# Patient Record
Sex: MALE | Race: WHITE | NOT HISPANIC OR LATINO | Employment: FULL TIME | URBAN - METROPOLITAN AREA
[De-identification: names, ages, dates, MRNs, and addresses within clinical notes are randomized per-mention and may not be internally consistent; named-entity substitution may affect disease eponyms.]

---

## 2017-11-01 ENCOUNTER — TELEPHONE (OUTPATIENT)
Dept: FAMILY MEDICINE | Facility: CLINIC | Age: 37
End: 2017-11-01

## 2017-11-01 DIAGNOSIS — J02.0 STREPTOCOCCAL SORE THROAT: Primary | ICD-10-CM

## 2017-11-01 RX ORDER — PENICILLIN V POTASSIUM 500 MG/1
500 TABLET, FILM COATED ORAL 2 TIMES DAILY
Qty: 20 TABLET | Refills: 0 | Status: SHIPPED | OUTPATIENT
Start: 2017-11-01 | End: 2019-07-29

## 2017-11-01 NOTE — TELEPHONE ENCOUNTER
"Reason for call:  Patient reporting a symptom    Symptom or request: Pt's daughter was diagnosed with strep a couple of days ago by Dr. Knott [facility].  Pt now has a \"scratchy throat.\"  He is asking for an Rx for antibiotics - [facility].  He is flying out of the country in 4 hours and fears that if he went to , he will miss his flight.    Duration (how long have symptoms been present): today    Have you been treated for this before? No    Additional comments:     Phone Number patient can be reached at:  Cell number on file:    Telephone Information:   Mobile [phone]       Best Time:  any    Can we leave a detailed message on this number:  YES    Call taken on 11/1/2017 at 1:16 PM by Ely Polanco    "

## 2019-06-05 ENCOUNTER — NURSE TRIAGE (OUTPATIENT)
Dept: NURSING | Facility: CLINIC | Age: 39
End: 2019-06-05

## 2019-06-06 NOTE — TELEPHONE ENCOUNTER
"Reason for Call/Nurse Assessment:  Wife Griselda calling for Cesar. Reports he was vomiting yesterday but not today, \"he is just tired\" and now for the past 1-2 hours, has c/o sternum pain. Reports he took 2 tylenol but it's not helping. Caller is NOT with patient at time of triage call. Denies cardiac hx.     RN Action/Disposition:  Advised caller to call FNA back when she gets home for triage; if patient cold/clammy/pale/sweaty and looks like he will pass out, or if he is having trouble breathing/SOB, they should call 911.    Caller verbalized understanding of care advice given and plans to call FNA back in 20 minutes when she gets home for appropriate triage. Encouraged call back to FNA 24/7 for new/worsening symptoms or further questions.    Rafaela Olivarez RN  [facility]      "

## 2019-06-20 ENCOUNTER — TELEPHONE (OUTPATIENT)
Dept: FAMILY MEDICINE | Facility: CLINIC | Age: 39
End: 2019-06-20

## 2019-07-29 ENCOUNTER — OFFICE VISIT (OUTPATIENT)
Dept: FAMILY MEDICINE | Facility: CLINIC | Age: 39
End: 2019-07-29
Payer: COMMERCIAL

## 2019-07-29 VITALS
BODY MASS INDEX: 22.99 KG/M2 | SYSTOLIC BLOOD PRESSURE: 114 MMHG | OXYGEN SATURATION: 99 % | RESPIRATION RATE: 12 BRPM | TEMPERATURE: 96.2 F | WEIGHT: 155.2 LBS | DIASTOLIC BLOOD PRESSURE: 80 MMHG | HEART RATE: 78 BPM | HEIGHT: 69 IN

## 2019-07-29 DIAGNOSIS — Z12.11 SPECIAL SCREENING FOR MALIGNANT NEOPLASMS, COLON: ICD-10-CM

## 2019-07-29 DIAGNOSIS — Z00.00 ROUTINE GENERAL MEDICAL EXAMINATION AT A HEALTH CARE FACILITY: Primary | ICD-10-CM

## 2019-07-29 LAB — GLUCOSE SERPL-MCNC: 100 MG/DL (ref 70–99)

## 2019-07-29 PROCEDURE — 36415 COLL VENOUS BLD VENIPUNCTURE: CPT | Performed by: FAMILY MEDICINE

## 2019-07-29 PROCEDURE — 99396 PREV VISIT EST AGE 40-64: CPT | Performed by: FAMILY MEDICINE

## 2019-07-29 PROCEDURE — 82947 ASSAY GLUCOSE BLOOD QUANT: CPT | Performed by: FAMILY MEDICINE

## 2019-07-29 ASSESSMENT — MIFFLIN-ST. JEOR: SCORE: 1604.36

## 2019-07-29 NOTE — LETTER
[facility] [place]  [address]  [facility] [address]  Phone: [phone]      7/29/2019     Cesar Bass  [address]  [facility] [address]      Dear Cesar:    Thank you for allowing me to participate in your care. Your recent test results were reviewed and listed below.      Your results are provided below for your review  Results for orders placed or performed in visit on 07/29/19   Glucose   Result Value Ref Range    Glucose 100 (H) 70 - 99 mg/dL      Your test result is acceptable.         Thank you for choosing [facility]. As a result, please continue with the treatment plan discussed in the office. Return as discussed or sooner if symptoms worsen or fail to improve.     If you have any further questions or concerns, please do not hesitate to contact us.    Sincerely,        Dr. Chinedu Tellez/collin

## 2019-07-29 NOTE — PROGRESS NOTES
SUBJECTIVE:   CC: Cesar Bass is an 38 year old male who presents for preventive health visit.     Healthy Habits:    Do you get at least three servings of calcium containing foods daily (dairy, green leafy vegetables, etc.)? yes    Amount of exercise or daily activities, outside of work: 3 day(s) per week for 45 min     Problems taking medications regularly not applicable    Medication side effects: Not applicable     Have you had an eye exam in the past two years? no    Do you see a dentist twice per year? 1-2 times a year    Do you have sleep apnea, excessive snoring or daytime drowsiness?no          Today's PHQ-2 Score:   PHQ-2 ( 1999 Pf[employer]) 7/29/2019 5/13/2014   Q1: Little interest or pleasure in doing things 0 0   Q2: Feeling down, depressed or hopeless 0 0   PHQ-2 Score 0 0       Abuse: Current or Past(Physical, Sexual or Emotional)- No  Do you feel safe in your environment? Yes    Social History     Tobacco Use     Smoking status: Never Smoker     Smokeless tobacco: Never Used   Substance Use Topics     Alcohol use: No     If you drink alcohol do you typically have >3 drinks per day or >7 drinks per week? No                      Last PSA: No results found for: PSA    Reviewed orders with patient. Reviewed health maintenance and updated orders accordingly -   Labs reviewed in EPIC    Reviewed and updated as needed this visit by clinical staff  Tobacco  Allergies  Meds  Med Hx  Surg Hx  Fam Hx  Soc Hx        Reviewed and updated as needed this visit by Provider            ROS:  CONSTITUTIONAL: NEGATIVE for fever, chills, change in weight  INTEGUMENTARY/SKIN: NEGATIVE for worrisome rashes, moles or lesions  EYES: NEGATIVE for vision changes or irritation  ENT: NEGATIVE for ear, mouth and throat problems  RESP: NEGATIVE for significant cough or SOB  CV: NEGATIVE for chest pain, palpitations or peripheral edema  GI: NEGATIVE for nausea, abdominal pain, heartburn, or change in bowel habits   male:  "negative for dysuria, hematuria, decreased urinary stream, erectile dysfunction, urethral discharge  MUSCULOSKELETAL: NEGATIVE for significant arthralgias or myalgia  NEURO: NEGATIVE for weakness, dizziness or paresthesias  PSYCHIATRIC: NEGATIVE for changes in mood or affect    OBJECTIVE:   /80 (BP Location: Right arm, Patient Position: Sitting, Cuff Size: Adult Regular)   Pulse 78   Temp 96.2  F (35.7  C) (Tympanic)   Resp 12   Ht 1.753 m (5' 9\")   Wt 70.4 kg (155 lb 3.2 oz)   SpO2 99%   BMI 22.92 kg/m    EXAM:  GENERAL: healthy, alert and no distress  EYES: Eyes grossly normal to inspection, PERRL and conjunctivae and sclerae normal  HENT: ear canals and TM's normal, nose and mouth without ulcers or lesions  NECK: no adenopathy, no asymmetry, masses, or scars and thyroid normal to palpation  RESP: lungs clear to auscultation - no rales, rhonchi or wheezes  CV: regular rate and rhythm, normal S1 S2, no S3 or S4, no murmur, click or rub, no peripheral edema and peripheral pulses strong  ABDOMEN: soft, nontender, no hepatosplenomegaly, no masses and bowel sounds normal  MS: no gross musculoskeletal defects noted, no edema  SKIN: no suspicious lesions or rashes  NEURO: Normal strength and tone, mentation intact and speech normal  PSYCH: mentation appears normal, affect normal/bright    Diagnostic Test Results:  Labs reviewed in Epic    ASSESSMENT/PLAN:   Cesar was seen today for physical.    Diagnoses and all orders for this visit:    Routine general medical examination at a health care facility  -     Glucose        COUNSELING:  Reviewed preventive health counseling, as reflected in patient instructions       Regular exercise       Healthy diet/nutrition    Estimated body mass index is 22.92 kg/m  as calculated from the following:    Height as of this encounter: 1.753 m (5' 9\").    Weight as of this encounter: 70.4 kg (155 lb 3.2 oz).         reports that he has never smoked. He has never used smokeless " tobacco.      Counseling Resources:  ATP IV Guidelines  Pooled Cohorts Equation Calculator  FRAX Risk Assessment  ICSI Preventive Guidelines  Dietary Guidelines for Americans, 2010  USDA's MyPlate  ASA Prophylaxis  Lung CA Screening    Chinedu Tellez MD  [facility]

## 2019-08-09 ENCOUNTER — NURSE TRIAGE (OUTPATIENT)
Dept: NURSING | Facility: CLINIC | Age: 39
End: 2019-08-09

## 2019-08-09 NOTE — TELEPHONE ENCOUNTER
3 years ago similar thing happened. Went to ER and Sports Med, PT, Chiro, nothing worked and was told he has to wait for it to pass. He won't go to the ER and doesn't want an appointment. He wanted me to diagnose the numbness. He thinks it's a pinched nerve. Torticollis started this, according to him. Lifted weights hoping it would help but seems to make it worse. He said if nausea returns he will seek out help. He's going to take ibuprofen and ice it.  Bibi Smiley RN-[facility]      Additional Information    Negative: Shock suspected (e.g., cold/pale/clammy skin, too weak to stand, low BP, rapid pulse)    Negative: Similar pain previously and it was from 'heart attack'    Negative: Similar pain previously from 'angina' and not relieved by nitroglycerin    Negative: Difficult to awaken or acting confused (e.g., disoriented, slurred speech)    Negative: Sounds like a life-threatening emergency to the triager    Negative: Followed an injury to neck (e.g., MVA, sports, impact or collision)    Negative: Chest pain    Negative: Lymph node in the neck is swollen or painful to the touch    Negative: Sore throat is the main symptom    Negative: Difficulty breathing or unusual sweating (e.g., sweating without exertion)    Negative: Chest pain lasting longer than 5 minutes    Negative: Stiff neck (can't touch chin to chest) and has headache    Negative: Stiff neck (can't touch chin to chest) and fever    Negative: Weakness of an arm or hand     Ring finger to shoulder numbness 80% sensation.    Negative: Problems with bowel or bladder control    Patient sounds very sick or weak to the triager     Pain at right now 7    Protocols used: NECK PAIN OR ICHPGDAMP-G-CT

## 2019-12-24 ENCOUNTER — OFFICE VISIT (OUTPATIENT)
Dept: FAMILY MEDICINE | Facility: CLINIC | Age: 39
End: 2019-12-24
Payer: COMMERCIAL

## 2019-12-24 VITALS
DIASTOLIC BLOOD PRESSURE: 84 MMHG | RESPIRATION RATE: 16 BRPM | SYSTOLIC BLOOD PRESSURE: 118 MMHG | HEIGHT: 69 IN | OXYGEN SATURATION: 98 % | HEART RATE: 73 BPM | TEMPERATURE: 97.3 F | WEIGHT: 153 LBS | BODY MASS INDEX: 22.66 KG/M2

## 2019-12-24 DIAGNOSIS — R03.0 ELEVATED BLOOD PRESSURE READING WITHOUT DIAGNOSIS OF HYPERTENSION: Primary | ICD-10-CM

## 2019-12-24 LAB
ANION GAP SERPL CALCULATED.3IONS-SCNC: 1 MMOL/L (ref 3–14)
BUN SERPL-MCNC: 18 MG/DL (ref 7–30)
CALCIUM SERPL-MCNC: 9.1 MG/DL (ref 8.5–10.1)
CHLORIDE SERPL-SCNC: 106 MMOL/L (ref 94–109)
CHOLEST SERPL-MCNC: 140 MG/DL
CO2 SERPL-SCNC: 30 MMOL/L (ref 20–32)
CREAT SERPL-MCNC: 0.95 MG/DL (ref 0.66–1.25)
GFR SERPL CREATININE-BSD FRML MDRD: >90 ML/MIN/{1.73_M2}
GLUCOSE SERPL-MCNC: 89 MG/DL (ref 70–99)
HDLC SERPL-MCNC: 68 MG/DL
LDLC SERPL CALC-MCNC: 57 MG/DL
NONHDLC SERPL-MCNC: 72 MG/DL
POTASSIUM SERPL-SCNC: 4.5 MMOL/L (ref 3.4–5.3)
SODIUM SERPL-SCNC: 137 MMOL/L (ref 133–144)
TRIGL SERPL-MCNC: 73 MG/DL

## 2019-12-24 PROCEDURE — 80048 BASIC METABOLIC PNL TOTAL CA: CPT | Performed by: FAMILY MEDICINE

## 2019-12-24 PROCEDURE — 36415 COLL VENOUS BLD VENIPUNCTURE: CPT | Performed by: FAMILY MEDICINE

## 2019-12-24 PROCEDURE — 99214 OFFICE O/P EST MOD 30 MIN: CPT | Performed by: FAMILY MEDICINE

## 2019-12-24 PROCEDURE — 80061 LIPID PANEL: CPT | Performed by: FAMILY MEDICINE

## 2019-12-24 ASSESSMENT — MIFFLIN-ST. JEOR: SCORE: 1589.38

## 2019-12-24 NOTE — PROGRESS NOTES
"Subjective     Cesar Bass is a 39 year old male who presents to clinic today for the following health issues:    HPI     Chief Complaint   Patient presents with     Hypertension     patient states his blood pressure was high when he went to give blood about 1 mo. ago  he has been taking his Blood pressure at the drug store  and is getting readings of 128/84                Reviewed and updated as needed this visit by Provider         Review of Systems         Objective    /84   Pulse 73   Temp 97.3  F (36.3  C)   Resp 16   Ht 1.753 m (5' 9\")   Wt 69.4 kg (153 lb)   SpO2 98%   BMI 22.59 kg/m    Body mass index is 22.59 kg/m .  Physical Exam               Further history obtained, clarified or corrected by physician:  He is very concerned and actually somewhat nervous about having already over 87 while he was giving blood last week.  He has checked his blood pressure many times since then and brings in that list showing they are all within normal limits.    OBJECTIVE:  /84   Pulse 73   Temp 97.3  F (36.3  C)   Resp 16   Ht 1.753 m (5' 9\")   Wt 69.4 kg (153 lb)   SpO2 98%   BMI 22.59 kg/m    LUNGS: clear to auscultation, normal breath sounds  CV: RRR without murmur  ABD: BS+, soft, nontender, no masses, no hepatosplenomegaly  EXTREMITIES: without joint tenderness, swelling or erythema.  No muscle tenderness or abnormality.  SKIN: No rashes or abnormalities  NEURO:non focal exam    ASSESSMENT:  Elevated blood pressure reading without diagnosis of hypertension    PLAN:    Reassurance  Recommend continue following blood pressures several times per month and return if they are elevated  Recommend routine healthcare maintenance.    Orders Placed This Encounter     Lipid panel reflex to direct LDL Fasting     Basic metabolic panel         "

## 2019-12-24 NOTE — PATIENT INSTRUCTIONS
Our Clinic hours are:  Mondays    7:20 am - 7 pm  Tues -  Fri  7:20 am - 5 pm    Clinic Phone: [phone]    The clinic lab opens at 7:30 am Mon - Fri and appointments are required.    [facility]. [phone]  Monday  8 am - 7pm  Tues - Fri 8 am - 5:30 pm

## 2019-12-24 NOTE — LETTER
[facility]  [address]  [facility] [address]  Phone: [phone]      12/27/2019     Cesar Bass  [address] [facility] [address]      Dear Cesar:    Thank you for allowing me to participate in your care. Your recent test results were reviewed and listed below.  tests are acceptable    Your results are provided below for your review  Results for orders placed or performed in visit on 12/24/19   Lipid panel reflex to direct LDL Fasting     Status: None   Result Value Ref Range    Cholesterol 140 <200 mg/dL    Triglycerides 73 <150 mg/dL    HDL Cholesterol 68 >39 mg/dL    LDL Cholesterol Calculated 57 <100 mg/dL    Non HDL Cholesterol 72 <130 mg/dL   Basic metabolic panel     Status: Abnormal   Result Value Ref Range    Sodium 137 133 - 144 mmol/L    Potassium 4.5 3.4 - 5.3 mmol/L    Chloride 106 94 - 109 mmol/L    Carbon Dioxide 30 20 - 32 mmol/L    Anion Gap 1 (L) 3 - 14 mmol/L    Glucose 89 70 - 99 mg/dL    Urea Nitrogen 18 7 - 30 mg/dL    Creatinine 0.95 0.66 - 1.25 mg/dL    GFR Estimate >90 >60 mL/min/[1.73_m2]    GFR Estimate If Black >90 >60 mL/min/[1.73_m2]    Calcium 9.1 8.5 - 10.1 mg/dL   Thank you for choosing [facility]. As a result, please continue with the treatment plan discussed in the office. Return as discussed or sooner if symptoms worsen or fail to improve.     If you have any further questions or concerns, please do not hesitate to contact us.    Sincerely,      Dr. Chinedu Tellez

## 2020-03-18 ENCOUNTER — NURSE TRIAGE (OUTPATIENT)
Dept: NURSING | Facility: CLINIC | Age: 40
End: 2020-03-18

## 2020-03-18 NOTE — TELEPHONE ENCOUNTER
Caller reports he have been ill for  30 hours with mild URI symptoms and no fever; unable to do  [facility] virtual assessment due to computer problem   Caller concerned about cough that worsened after he completed a run   4 children and they are starting to have URI symptoms   Caller does not identify any symptoms that require medical attention and is comfortable with self care   caller  Requesting  clarification on self isolation in the  absense of  available testing for Covid 19  Triage protocol reviewed   Advised in home care   reviewed current instructions per protocol with him   Advised in watchful waiting and  to call for any new or worsening symptoms   Caller understands and will comply     Kendra Harris RN  [facility]              Instructions for Patients  Your symptoms could be due to COVID-19, but it also could be due to a number of other respiratory illnesses.  We are not doing testing at this time for COVID-19 unless symptoms are severe enough to require hospitalization.  It is recommended that you stay home to prevent the spread of your illness.  To do this follow these instructions:    Regardless of if you have been tested or not:  Patient who have symptoms (cough, fever, or shortness of breath), need to isolate for 7 days from when symptoms started OR 72 hours after fever resolves (without fever reducing medications) AND improvement of respiratory symptoms (whichever is longer).      Isolate yourself at home (in own room/own bathroom if possible)    Do Not allow any visitors    Do Not go to work or school    Do Not go to [employer],  centers, shopping, or other public places.    Do Not shake hands.    Avoid close and intimate contact with others (hugging, kissing).    Follow CDC recommendations for household cleaning of frequently touched services.     After the initial 7 days, continue to isolate yourself from household members as much as possible. To continue decrease the risk of community spread and  exposure, you and any members of your household should limit activities in public for 14 days after starting home isolation.     You can reference the following CDC link for helpful home isolation/care tips:  https://www.cdc.gov/coronavirus/2019-ncov/downloads/10Things.pdf    Protect Others:    Cover your mouth and nose with a mask, disposable tissue or wash cloth to avoid spreading germs to others.    Wash your hands and face frequently with soap and water.    Fever Medicines:    For fever relief, take acetaminophen or ibuprofen.    Treat fevers above 101  F (38.3  C) to lower fevers and make you more comfortable.     Acetaminophen (e.g., Tylenol): Take 650 mg (two 325 mg pills) by mouth every 4-6 hours as needed of regular strength Tylenol or 1,000 mg (two 500 mg pills) every 8 hours as needed of Extra Strength Tylenol.     Ibuprofen (e.g., Motrin, Advil): Take 400 mg (two 200 mg pills) by mouth every 6 hours as needed.     Acetaminophen is thought to be safer than ibuprofen or naproxen for people over 65 years old. Acetaminophen is in many OTC and prescription medicines. It might be in more than one medicine that you are taking. You need to be careful and not take an overdose. Before taking any medicine, read all the instructions on the package.    Caution - NSAIDs (e.g., ibuprofen, naproxen): Do not take nonsteroidal anti-inflammatory drugs (NSAIDs) if you have stomach problems, kidney disease, heart failure, or other contraindications to using this type of medicine. Do not take NSAID medicines for over 7 days without consulting your PCP. Do not take NSAID medicines if you are pregnant. Do not take NSAID medicines if you are also taking blood thinners.     Call Back If: Breathing difficulty develops or you become worse.    Thank you for limiting contact with others, wearing a simple mask to cover your cough, practice good hand hygiene habits and accessing our virtual services where possible to limit the spread  of this virus.    For more information about COVID19 and options for caring for yourself at home, please visit the CDC website at https://www.cdc.gov/coronavirus/2019-ncov/about/steps-when-sick.html  For more options for care at [facility], please visit our website at [email or web address]         Additional Information    Negative: Severe difficulty breathing (e.g., struggling for each breath, speaks in single words)    Negative: Bluish (or gray) lips or face now    Negative: [1] Rapid onset of cough AND [2] has hives    Negative: Coughing started suddenly after medicine, an allergic food or bee sting    Negative: [1] Difficulty breathing AND [2] exposure to flames, smoke, or fumes    Negative: [1] Stridor AND [2] difficulty breathing    Negative: Sounds like a life-threatening emergency to the triager    Negative: Choked on object of food that could be caught in the throat    Negative: Chest pain is main symptom    Negative: [1] Previous asthma attacks AND [2] this feels like asthma attack    Negative: Cough lasts > 3 weeks    Negative: Wet (productive) cough (i.e., white-yellow, yellow, green, or donavon colored sputum)    Negative: Chest pain  (Exception: MILD central chest pain, present only when coughing)    Negative: Difficulty breathing    Negative: Patient sounds very sick or weak to the triager    Negative: [1] Coughed up blood AND [2] > 1 tablespoon (15 ml) (Exception: blood-tinged sputum)    Negative: Fever > 103 F (39.4 C)    Negative: [1] Fever > 101 F (38.3 C) AND [2] age > 60    Negative: [1] Fever > 100.0 F (37.8 C) AND [2] bedridden (e.g., nursing home patient, CVA, chronic illness, recovering from surgery)    Negative: [1] Fever > 100.0 F (37.8 C) AND [2] diabetes mellitus or weak immune system (e.g., HIV positive, cancer chemo, splenectomy, chronic steroids)    Negative: Wheezing is present    Negative: [1] Ankle swelling AND [2] swelling is increasing    Negative:  SEVERE coughing spells (e.g., whooping sound after coughing, vomiting after coughing)    Negative: [1] Continuous (nonstop) coughing interferes with work or school AND [2] no improvement using cough treatment per protocol    Negative: Fever present > 3 days (72 hours)    Negative: [1] Fever returns after gone for over 24 hours AND [2] symptoms worse or not improved    Negative: [1] Using nasal washes and pain medicine > 24 hours AND [2] sinus pain (around cheekbone or eye) persists    Negative: Earache is present    Negative: Cough has been present for > 3 weeks    Negative: [1] Nasal discharge AND [2] present > 10 days    Negative: [1] Coughed up blood-tinged sputum AND [2] more than once    Negative: [1] Patient also has allergy symptoms (e.g., itchy eyes, clear nasal discharge, postnasal drip) AND [2] they are acting up    Negative: Taking an ACE Inhibitor medication  (e.g., benazepril/LOTENSIN, captopril/CAPOTEN, enalapril/VASOTEC, lisinopril/ZESTRIL)    Negative: Exposure to TB (Tuberculosis)    Cough with cold symptoms (e.g., runny nose, postnasal drip, throat clearing)    Protocols used: COUGH - ACUTE NON-PRODUCTIVE-A-AH

## 2020-04-02 ENCOUNTER — VIRTUAL VISIT (OUTPATIENT)
Dept: FAMILY MEDICINE | Facility: CLINIC | Age: 40
End: 2020-04-02
Payer: COMMERCIAL

## 2020-04-02 DIAGNOSIS — J02.9 SORE THROAT: Primary | ICD-10-CM

## 2020-04-02 PROCEDURE — 99213 OFFICE O/P EST LOW 20 MIN: CPT | Mod: TEL | Performed by: NURSE PRACTITIONER

## 2020-04-02 NOTE — PROGRESS NOTES
"  Cesar Bass is a 39 year old male who is being evaluated via a billable telephone visit.      The patient has been notified of following:     \"This telephone visit will be conducted via a call between you and your physician/provider. We have found that certain health care needs can be provided without the need for a physical exam.  This service lets us provide the care you need with a short phone conversation.  If a prescription is necessary we can send it directly to your pharmacy.  If lab work is needed we can place an order for that and you can then stop by our lab to have the test done at a later time.    If during the course of the call the physician/provider feels a telephone visit is not appropriate, you will not be charged for this service.\"     Patient has given verbal consent for Telephone visit?  Yes    Cesar Bass complains of   Chief Complaint   Patient presents with     Pharyngitis     ENT Symptoms             Symptoms: cc Present Absent Comment   Fever/Chills   x    Fatigue  x     Muscle Aches  x     Eye Irritation  x  itchy   Sneezing   x    Nasal Anatoly/Drg   x    Sinus Pressure/Pain   x    Loss of smell   x    Dental pain   x    Sore Throat x x  States his throat feels like he drank lava, is red with no white spots.    Swollen Glands   x    Ear Pain/Fullness   x    Cough   x    Wheeze   x    Chest Pain   x    Shortness of breath   x    Rash   x    Other   x      Symptom duration:  4 days    Symptom severity:  moderate    Treatments tried:  3-4 aspirin per day, benadryl, zycam, gargle warm salt water, baking soda, hydrogen peroxide   Contacts:  6 year old son was sick 1 week ago     Patient states that 2 weeks ago his son had a fever for about 4 hours that was 100 with a cough and STDs.  Shortly after this he started having sore throat and cough.  He also states that he had a soreness in the center of his chest at that time.  This went away for a couple of days.  He continued to have a " lump in his throat for about a week and then it came back 4 days ago with sore throat.  He denies any cough, sneezing, drainage both nasally or postnasal, rashes, nausea or vomiting.  He also states that there is no tenderness when he presses on his cervical anterior glands.  Patient does have muscle ache and fatigue with burning sore throat.  He is eating and drinking fine.  He denies any tonsillar enlargement but states that his uvula is red and around the upper arch of his mouth it is red.  He does not have a history of strep.  He has used aspirin, salt water gargles, Benadryl which none have been helpful.  Patient's daughter has history of strep but he has not seen her having any symptoms of sore throat or cold symptoms.  He states that he has been staying at home for COVID 19 and has had no other contacts that are known.     ALLERGIES  Patient has no known allergies.    Patient Active Problem List   Diagnosis     ETD (eustachian tube dysfunction)     CARDIOVASCULAR SCREENING; LDL GOAL LESS THAN 130     No past surgical history on file.    Social History     Tobacco Use     Smoking status: Never Smoker     Smokeless tobacco: Never Used   Substance Use Topics     Alcohol use: No     Family History   Problem Relation Age of Onset     Diabetes Paternal Grandmother      Heart Disease Paternal Grandfather      Cerebrovascular Disease Brother      Heart Disease Brother      Hypertension Father          No current outpatient medications on file.     No Known Allergies    Reviewed and updated as needed this visit by Provider         Review of Systems   ROS COMP: Constitutional, HEENT, cardiovascular, pulmonary, gi and gu systems are negative, except as otherwise noted.       Objective   Reported vitals:  There were no vitals taken for this visit.   healthy, alert and no distress  Psych: Alert and oriented times 3; coherent speech, normal   rate and volume, able to articulate logical thoughts, able   to abstract reason,  no tangential thoughts, no hallucinations   or delusions  His affect is normal     Diagnostic Test Results:  none         Assessment/Plan:  1. Sore throat  She is not exhibiting typical symptoms of strep at this time.  This may be just a viral pharyngitis.  Recommend conservative treatment with salt water gargle, Tylenol, cool or warm liquids, and throat lozenges as needed for symptom relief.  He did discuss using Claritin and this would only be for symptoms of allergy which she currently does not feel that he is having and may not be helpful.  I have put in an order for future strep testing.  Recommend waiting and seeing how things go over the next 24 hours if persistent symptoms would recommend strep test tomorrow prior to the weekend.  If any worsening symptoms with spots on the back of the throat, fevers, or sore glands, recommend phone call for treatment.  Patient verbalized understanding and agreement to this plan at this time.  - Streptococcus A Rapid Scr w Reflx to PCR; Future    Phone call duration:  15 minutes    Tg Cruz NP

## 2020-04-03 DIAGNOSIS — J02.9 SORE THROAT: ICD-10-CM

## 2020-04-03 DIAGNOSIS — J06.0 SORE THROAT AND LARYNGITIS: Primary | ICD-10-CM

## 2020-04-03 LAB
DEPRECATED S PYO AG THROAT QL EIA: NEGATIVE
SPECIMEN SOURCE: NORMAL
SPECIMEN SOURCE: NORMAL
STREP GROUP A PCR: NOT DETECTED

## 2020-04-03 PROCEDURE — 40001204 ZZHCL STATISTIC STREP A RAPID: Performed by: NURSE PRACTITIONER

## 2020-04-03 PROCEDURE — 87651 STREP A DNA AMP PROBE: CPT | Performed by: NURSE PRACTITIONER

## 2020-04-03 RX ORDER — PREDNISONE 20 MG/1
60 TABLET ORAL ONCE
Qty: 3 TABLET | Refills: 0 | Status: SHIPPED | OUTPATIENT
Start: 2020-04-03 | End: 2020-04-07

## 2020-04-04 ENCOUNTER — NURSE TRIAGE (OUTPATIENT)
Dept: NURSING | Facility: CLINIC | Age: 40
End: 2020-04-04

## 2020-04-04 NOTE — TELEPHONE ENCOUNTER
Cesar is returning a call from the clinic about his lab results. His strep test is negative. He wanted something to treat his infection, I told him it was a virus and he just needed to wait it out and treat the symptoms. Call back if not better in 3 to 5 days.    Patria Bertrand RN/ [facility]        Additional Information    [1] Throat culture negative AND [2] symptoms not worse    Protocols used: STREP THROAT TEST FOLLOW-UP CALL-A-AH

## 2020-04-06 ENCOUNTER — TELEPHONE (OUTPATIENT)
Dept: FAMILY MEDICINE | Facility: CLINIC | Age: 40
End: 2020-04-06

## 2020-04-06 NOTE — TELEPHONE ENCOUNTER
Reason for call:  Patient reporting a symptom    Symptom or request: Pt did a virtual visit with POLO Cruz 4/2 and still has a sore throat - No fever, but pt does have body aches.  Pt's son also has a cough and congestion.  Please call patient and advise.      Duration (how long have symptoms been present): ongoing    Have you been treated for this before? Yes    Additional comments:     Phone Number patient can be reached at:  Home number on file [phone] (home)    Best Time:  any    Can we leave a detailed message on this number:  YES    Call taken on 4/6/2020 at 12:58 PM by Ely Polanco

## 2020-04-06 NOTE — TELEPHONE ENCOUNTER
"Update on symptoms.  No sore throat @ this time,no runny nose, no ear pain, no SOB,no fever.  Pt states when he breathes it feels \"heavy\" occasional cough feels \"heavy\".  Pt states no allergies.  Pt will f/u if symptoms worsen ie:fever,cough,SOB.  KPavelrn        "

## 2020-04-07 ENCOUNTER — VIRTUAL VISIT (OUTPATIENT)
Dept: FAMILY MEDICINE | Facility: CLINIC | Age: 40
End: 2020-04-07
Payer: COMMERCIAL

## 2020-04-07 DIAGNOSIS — J20.9 ACUTE BRONCHITIS WITH SYMPTOMS > 10 DAYS: Primary | ICD-10-CM

## 2020-04-07 PROCEDURE — 99213 OFFICE O/P EST LOW 20 MIN: CPT | Mod: TEL | Performed by: NURSE PRACTITIONER

## 2020-04-07 RX ORDER — AZITHROMYCIN 250 MG/1
TABLET, FILM COATED ORAL
Qty: 6 TABLET | Refills: 0 | Status: SHIPPED | OUTPATIENT
Start: 2020-04-07 | End: 2020-04-17

## 2020-04-07 NOTE — PROGRESS NOTES
"Subjective     Cesar Bass is a 39 year old male who is being evaluated via a billable telephone visit.      The patient has been notified of following:     \"This telephone visit will be conducted via a call between you and your physician/provider. We have found that certain health care needs can be provided without the need for a physical exam.  This service lets us provide the care you need with a short phone conversation.  If a prescription is necessary we can send it directly to your pharmacy.  If lab work is needed we can place an order for that and you can then stop by our lab to have the test done at a later time.    If during the course of the call the physician/provider feels a telephone visit is not appropriate, you will not be charged for this service.\"     Patient has given verbal consent for Telephone visit?  Yes    Cesar Bass complains of   Chief Complaint   Patient presents with     Cough     This patient was recommended a virtual visit as opposed to an office visit as part of the social distancing recommendations to prevent the spread of COVID19 per CDC recommendations.     ALLERGIES  Patient has no known allergies.    ENT Symptoms             Symptoms: cc Present Absent Comment   Fever/Chills   x    Fatigue  x     Muscle Aches  x     Eye Irritation   x    Sneezing   x    Nasal Anatoly/Drg   x    Sinus Pressure/Pain   x    Loss of smell  x     Dental pain   x    Sore Throat  x     Swollen Glands   x    Ear Pain/Fullness   x    Cough x x  Productive    Wheeze  x  Little bit    Chest Pain  x  Dull ache but not painful    Shortness of breath   x    Rash   x    Other         Symptom duration:  3 weeks   Symptom severity:  mild    Treatments tried:  tylenol, salt gargle, listerine, oranges for vitamin c,   Contacts:  son has cough and fever          Patient Active Problem List   Diagnosis     ETD (eustachian tube dysfunction)     CARDIOVASCULAR SCREENING; LDL GOAL LESS THAN 130     History " reviewed. No pertinent surgical history.    Social History     Tobacco Use     Smoking status: Never Smoker     Smokeless tobacco: Never Used   Substance Use Topics     Alcohol use: No     Family History   Problem Relation Age of Onset     Diabetes Paternal Grandmother      Heart Disease Paternal Grandfather      Cerebrovascular Disease Brother      Heart Disease Brother      Hypertension Father            Reviewed and updated as needed this visit by Provider         Review of Systems   ROS COMP: Constitutional, HEENT, cardiovascular, pulmonary, gi and gu systems are negative, except as otherwise noted.       Objective   Reported vitals:  There were no vitals taken for this visit.     Psych: Alert and oriented times 3; coherent speech, normal   rate and volume, able to articulate logical thoughts, able   to abstract reason, no tangential thoughts, no hallucinations   or delusions.     Diagnostic Test Results:  none         Assessment/Plan:  1. Acute bronchitis with symptoms > 10 days    Symptoms consistent with viral process with return of symptoms worse including productive cough and mild wheeze. Trial Z-pack if not helping will need office visit. To ER or UC for new or worsening symptoms.     - azithromycin (ZITHROMAX) 250 MG tablet; Take 2 tablets (500 mg) by mouth daily for 1 day, THEN 1 tablet (250 mg) daily for 4 days.  Dispense: 6 tablet; Refill: 0    No follow-ups on file.      Phone call duration:  12 minutes    LAURA Lopez CNP

## 2020-04-07 NOTE — TELEPHONE ENCOUNTER
FYI: Pt was recommended to do [email or web address] on 3/18/20, for symptoms/cough, pt was unable to get through.  On 4/2/20 pt had a virtual visit for Pharyngitis, tested negative for strep, recommended to home care.  On 4/6/20 per RN triage note pt was to do home comfort measures ( no fever, no SOB, occasional cough)  Today, 4/8/20 pt recommended to do an [email or web address] visit.  Pt will call back if unable to get through to [facility].    Pt unable to get through to [facility].  Virtual visit scheduled this AM @ 9:20.    Zelda

## 2020-04-07 NOTE — TELEPHONE ENCOUNTER
Reason for call:  Patient reporting a symptom    Symptom or request: Pt spoke to RN yesterday and 4/4 - Pt also did virtual visit with POLO Cruz 4/2.  Pt calling because symptoms are worse than yesterday. No fever, but pt does have chills and a bad cough. Pt has not self-isolated from his family of 6.  Please call patient and advise.      Duration (how long have symptoms been present): ongoing    Have you been treated for this before? Yes    Additional comments:     Phone Number patient can be reached at:  Home number on file [phone] (home)    Best Time:  any    Can we leave a detailed message on this number:  YES    Call taken on 4/7/2020 at 7:34 AM by Ely Polanco

## 2020-04-14 ENCOUNTER — TELEPHONE (OUTPATIENT)
Dept: FAMILY MEDICINE | Facility: CLINIC | Age: 40
End: 2020-04-14

## 2020-04-14 NOTE — TELEPHONE ENCOUNTER
Spoke with pt @ length about returning to work.  Gave pt recommendations from CDC/[facility] concerning returning to work guidelines:  - 3 days no fever. (Pt didn't have fever)  - 7 days post symptoms started. (has been several weeks since @ work)  - wearing a mask, staying 6 feet apart, wiping down surfaces.  Pt has his own small business.  Danny

## 2020-04-14 NOTE — TELEPHONE ENCOUNTER
Reason for call:    Symptom or request:     Patent called stating that he was seen but Hina DONTE 04/07/2020-- with bronchitis, is asking if he can try back to work.    Patient reports his symptoms started following a run around the block.     Finished antibiotic reports improvement, continues to have a lingering cough. And notes some wheezing.       Best Time:  any    Can we leave a detailed message on this number?  YES     Asha VÁSQUEZ  Station

## 2020-04-16 ENCOUNTER — TELEPHONE (OUTPATIENT)
Dept: FAMILY MEDICINE | Facility: CLINIC | Age: 40
End: 2020-04-16

## 2020-04-16 NOTE — TELEPHONE ENCOUNTER
Virtual visit scheduled for tomorrow.  Pt concerned about cough.  Pt has had cough for over 2 weeks.  See previous notes.  Kpavelrn

## 2020-04-16 NOTE — TELEPHONE ENCOUNTER
Reason for Call:  cough    Detailed comments: patient is calling again about his cough. He wants to talk to JOSE Rocha. He feels fine, just still coughing up phlegm and finished his antibiotic. Thinking this should be gone by now. Does he need a Chest X Ray? Please advise. Please see 4/14 note.    Phone Number Patient can be reached at: Home number on file [phone] (home)    Best Time: any    Can we leave a detailed message on this number? YES   Lorena Velazquez  [facility]        Call taken on 4/16/2020 at 8:57 AM by Lorena Hannon

## 2020-04-17 ENCOUNTER — VIRTUAL VISIT (OUTPATIENT)
Dept: FAMILY MEDICINE | Facility: CLINIC | Age: 40
End: 2020-04-17
Payer: COMMERCIAL

## 2020-04-17 DIAGNOSIS — J98.01 BRONCHOSPASM: Primary | ICD-10-CM

## 2020-04-17 PROCEDURE — 99213 OFFICE O/P EST LOW 20 MIN: CPT | Mod: 95 | Performed by: NURSE PRACTITIONER

## 2020-04-17 RX ORDER — BENZONATATE 200 MG/1
200 CAPSULE ORAL 3 TIMES DAILY PRN
Qty: 12 CAPSULE | Refills: 0 | Status: SHIPPED | OUTPATIENT
Start: 2020-04-17 | End: 2022-06-24

## 2020-04-17 NOTE — PROGRESS NOTES
"Cesar Bass is a 39 year old male who is being evaluated via a billable telephone visit.      The patient has been notified of following:     \"This telephone visit will be conducted via a call between you and your physician/provider. We have found that certain health care needs can be provided without the need for a physical exam.  This service lets us provide the care you need with a short phone conversation.  If a prescription is necessary we can send it directly to your pharmacy.  If lab work is needed we can place an order for that and you can then stop by our lab to have the test done at a later time.    Telephone visits are billed at different rates depending on your insurance coverage. During this emergency period, for some insurers they may be billed the same as an in-person visit.  Please reach out to your insurance provider with any questions.    If during the course of the call the physician/provider feels a telephone visit is not appropriate, you will not be charged for this service.\"    Patient has given verbal consent for Telephone visit?  Yes    How would you like to obtain your AVS? Mail a copy    This patient was recommended a virtual visit as opposed to an office visit as part of the social distancing recommendations to prevent the spread of COVID19 per CDC recommendations.     Subjective     Cesar Bass is a 39 year old male who presents to clinic today for the following health issues:    ENT Symptoms             Symptoms: cc Present Absent Comment   Fever/Chills   x    Fatigue   x    Muscle Aches   x    Eye Irritation   x    Sneezing   x    Nasal Anatoly/Drg   x    Sinus Pressure/Pain   x    Loss of smell   x    Dental pain   x    Sore Throat   x    Swollen Glands   x    Ear Pain/Fullness   x    Cough x x  Productive cough for the most part, hard to cough up the phlegm, persistent, worse as the day goes on, heaviness in his chest    Wheeze   x    Chest Pain   x    Shortness of breath   x  "   Rash   x    Other         Symptom duration:  4 weeks    Symptom severity:  mild    Treatments tried:  zpack   Contacts:  son had similar sx a month ago        Fatigue and body aches have resolved.       Patient Active Problem List   Diagnosis     ETD (eustachian tube dysfunction)     CARDIOVASCULAR SCREENING; LDL GOAL LESS THAN 130     History reviewed. No pertinent surgical history.    Social History     Tobacco Use     Smoking status: Never Smoker     Smokeless tobacco: Never Used   Substance Use Topics     Alcohol use: No     Family History   Problem Relation Age of Onset     Diabetes Paternal Grandmother      Heart Disease Paternal Grandfather      Cerebrovascular Disease Brother      Heart Disease Brother      Hypertension Father            Reviewed and updated as needed this visit by Provider         Review of Systems   ROS COMP: Constitutional, HEENT, cardiovascular, pulmonary, gi and gu systems are negative, except as otherwise noted.       Objective   Reported vitals:  There were no vitals taken for this visit.   alert and no distress  PSYCH: Alert and oriented times 3; coherent speech, normal   rate and volume, able to articulate logical thoughts, able   to abstract reason, no tangential thoughts, no hallucinations   or delusions  His affect is normal and pleasant  RESP: No cough, no audible wheezing, able to talk in full sentences  Remainder of exam unable to be completed due to telephone visits    Diagnostic Test Results:  none         Assessment/Plan:  1. Bronchospasm    - benzonatate (TESSALON) 200 MG capsule; Take 1 capsule (200 mg) by mouth 3 times daily as needed for cough  Dispense: 12 capsule; Refill: 0    Likely post-infectious cough. Reassured, not contagious at this point.    No follow-ups on file.      Phone call duration:  10 minutes    LAURA Lopez CNP

## 2020-04-17 NOTE — PATIENT INSTRUCTIONS
Patient Education     Bronchospasm (Adult)    Bronchospasm occurs when the airways (bronchial tubes) go into spasm and contract. This makes it hard to breathe and causes wheezing (a high-pitched whistling sound). Bronchospasm can also cause frequent coughing without wheezing.  Bronchospasm is due to irritation, inflammation, or allergic reaction of the airways. People with asthma get bronchospasm. However, not everyone with bronchospasm has asthma.  Being exposed to harmful fumes, a recent case of bronchitis, exercise, or a flare-up of chronic obstructive pulmonary disease (COPD) may cause the airways to spasm. An episode of bronchospasm may last 7 to 14 days. Medicine may be prescribed to relax the airways and prevent wheezing. Antibiotics will be prescribed only if your healthcare provider thinks there is a bacterial infection. Antibiotics do not help a viral infection.  Home care    Drink lots of water or other fluids (at least 10 glasses a day) during an attack. This will loosen lung secretions and make it easier to breathe. If you have heart or kidney disease, check with your doctor before you drink extra fluids.    Take prescribed medicine exactly at the times advised. If you take an inhaled medicine to help with breathing, don't use it more than once every 4 hours, unless told to do so. If prescribed an antibiotic or prednisone, take all of the medicine, even if you are feeling better after a few days.    Don't smoke. Also avoid being exposed to secondhand smoke.    If you were given an inhaler, use it exactly as directed. If you need to use it more often than prescribed, your condition may be getting worse. Contact your healthcare provider.  Follow-up care  Follow up with your healthcare provider, or as advised.  If you are age 65 or older, have a chronic lung disease or condition that affects your immune system, or you smoke, ask your healthcare provider about getting a pneumococcal vaccine, as well as a  yearly flu shot (influenza vaccine).  When to seek medical advice  Call your healthcare provider right away if any of these occur:    You need to use your inhalers more often than usual    Fever of 100.4 F (38 C) or higher, or as directed by your healthcare provider    Cough that brings up lots of dark-colored sputum (mucus)    You don't get better within 24 hours  Call 911  Call 911 if any of these occur:    Coughing up bloody sputum (mucus)    Chest pain with each breath    Increased wheezing or shortness of breath  Date Last Reviewed: 6/1/2018 2000-2019 [employer] [address] [place] [id number]. All rights reserved. This information is not intended as a substitute for professional medical care. Always follow your healthcare professional's instructions.

## 2020-05-11 ENCOUNTER — VIRTUAL VISIT (OUTPATIENT)
Dept: UROLOGY | Facility: CLINIC | Age: 40
End: 2020-05-11
Payer: COMMERCIAL

## 2020-05-11 DIAGNOSIS — N47.1 PHIMOSIS: Primary | ICD-10-CM

## 2020-05-11 PROCEDURE — 99212 OFFICE O/P EST SF 10 MIN: CPT | Mod: TEL | Performed by: UROLOGY

## 2020-05-11 NOTE — PROGRESS NOTES
Foreskin is uncomfortable during intercourse and would like to proceed with circumcision.    Will schedule for summer.

## 2020-11-27 ENCOUNTER — VIRTUAL VISIT (OUTPATIENT)
Dept: FAMILY MEDICINE | Facility: OTHER | Age: 40
End: 2020-11-27

## 2020-11-27 NOTE — PROGRESS NOTES
"Date: 2020 11:27:37  Clinician: Orion Victor  Clinician NPI: [id number]  Patient: Cesar Bass  Patient : 1980  Patient Address: [address] [place], [place] [address]  Patient Phone: [phone]  Visit Protocol: URI  Patient Summary:  Cesar is a 39 year old ( : 1980 ) male who initiated a [facility] Visit for COVID-19 (Coronavirus) evaluation and screening. When asked the question \"Please sign me up to receive news, health information and promotions. \", Cesar responded \"No\".    When asked when his symptoms started, Cesar reported that he does not have any symptoms.   He denies taking antibiotic medication in the past month and having recent facial or sinus surgery in the past 60 days.    Pertinent COVID-19 (Coronavirus) information  Cesar does not work or volunteer as healthcare worker or a . In the past 14 days, Cesar has not worked or volunteered at a healthcare facility or group living setting.   In the past 14 days, he also has not lived in a congregate living setting.   Cesar has not had a close contact with a laboratory-confirmed COVID-19 patient within the last 14 days.    Since 2019, Cesar has not been tested for COVID-19 and has not had upper respiratory infection or influenza-like illness.   Pertinent medical history  He has not been told by his provider to avoid NSAIDs.   Cesar does not have diabetes. He denies having immunosuppressive conditions (e.g., chemotherapy, HIV, organ transplant, long-term use of steroids or other immunosuppressive medications, splenectomy). He does not have severe COPD and congestive heart failure. He does not have asthma.   Cesar does not need a return to work/school note.   Weight: 155 lbs   Cesar does not smoke or use smokeless tobacco.   Weight: 155 lbs  Reason for repeat visit for the same protocol within 24 hours:  A family member was recently with someone who tested positive for COVID19.  Now my family member has a " sore throat.  See the History of referred by protocol and completed visits section for details on previous visits (visits currently in queue to be diagnosed will not appear in this section).    MEDICATIONS: No current medications, ALLERGIES: NKDA  Clinician Response:  Dear Cesar,   Based on your exposure to COVID-19 (coronavirus), we would like to test you for this virus.  1. Please call [phone] to schedule your visit. Explain that you were referred by [facility] to have a COVID-19 test. Be [facility].  * If you need to schedule in [facility] please call [phone] or for [employer] employees please call [phone].   * If you need to schedule in the [place] area please call [phone]. [employer] employees call [phone].   The following will serve as your written order for this COVID Test, ordered by me, for the indication of suspected COVID [Z20.828]: The test will be ordered in [facility], our electronic health record, after you are scheduled. It will show as ordered and authorized by Davin Liriano MD.  Order: COVID-19 (coronavirus) PCR for ASYMPTOMATIC EXPOSURE testing from [facility].   If you know you have had close contact with someone who tested positive, you should be quarantined for 14 days after this exposure. You should stay in quarantine for the14 days even if the covid test is negative, the optimal time to test after exposure is 5-7 days from the exposure  Quarantine means   What should I do?  For safety, it's very important to follow these rules. Do this for 14 days after the date you were last exposed to the virus..  Stay home and away from others. Don't go to school or anywhere else. Generally quarantine means staying home from work but there are some exceptions to this. Please contact your workplace.   No hugging, kissing or shaking hands.  Don't let anyone visit.  Cover your mouth and nose with a mask, tissue or washcloth to avoid spreading germs.  Wash your hands and  face often. Use soap and water.  What are the symptoms of COVID-19?  The most common symptoms are cough, fever and trouble breathing. Less common symptoms include headache, body aches, fatigue (feeling very tired), chills, sore throat, stuffy or runny nose, diarrhea (loose poop), loss of taste or smell, belly pain, and nausea or vomiting (feeling sick to your stomach or throwing up).  After 14 days, if you have still don't have symptoms, you likely don't have this virus.  If you develop symptoms, follow these guidelines.  If you're normally healthy: Please start another [facility] visit to report your symptoms. Go to [email or web address].  If you have a serious health problem (like cancer, heart failure, an organ transplant or kidney disease): Call your specialty clinic. Let them know that you might have COVID-19.  2. When it's time for your COVID test:  Stay at least 6 feet away from others. (If someone will drive you to your test, stay in the backseat, as far away from the  as you can.)  Cover your mouth and nose with a mask, tissue or washcloth.  Go straight to the testing site. Don't make any stops on the way there or back.  Please note  Caregivers in these groups are at risk for severe illness due to COVID-19:  o People 65 years and older  o People who live in a nursing home or long-term care facility  o People with chronic disease (lung, heart, cancer, diabetes, kidney, liver, immunologic)  o People who have a weakened immune system, including those who:  Are in cancer treatment  Take medicine that weakens the immune system, such as corticosteroids  Had a bone marrow or organ transplant  Have an immune deficiency  Have poorly controlled HIV or AIDS  Are obese (body mass index of 40 or higher)  Smoke regularly  Where can I get more information?   [employer] -- About COVID-19: [email or web address]  CDC -- What to Do If You're Sick: www.cdc.gov/coronavirus/2019-ncov/about/steps-when-sick.html  CDC --  Ending Home Isolation: www.cdc.gov/coronavirus/2019-ncov/hcp/disposition-in-home-patients.html  CDC -- Caring for Someone: www.cdc.gov/coronavirus/2019-ncov/if-you-are-sick/care-for-someone.html  [facility] -- Interim Guidance for Hospital Discharge to Home: www.health.[facility].[place]./diseases/coronavirus/hcp/hospdischarge.pdf  [facility] clinical trials (COVID-19 research studies): clinicalaffairs.[facility]/n-clinical-trials  Below are the COVID-19 hotlines at the [place] [facility] ([facility]). Interpreters are available.  For health questions: Call 058-315-3155 or 1-427.250.9010 (7 a.m. to 7 p.m.)  For questions about schools and childcare: Call 707-352-8958 or 1-819.981.7257 (7 a.m. to 7 p.m.)    Diagnosis: Contact with and (suspected) exposure to other viral communicable diseases  Diagnosis ICD: Z20.828

## 2021-01-14 ENCOUNTER — HEALTH MAINTENANCE LETTER (OUTPATIENT)
Age: 41
End: 2021-01-14

## 2021-10-24 ENCOUNTER — HEALTH MAINTENANCE LETTER (OUTPATIENT)
Age: 41
End: 2021-10-24

## 2022-02-13 ENCOUNTER — HEALTH MAINTENANCE LETTER (OUTPATIENT)
Age: 42
End: 2022-02-13

## 2022-06-24 ENCOUNTER — OFFICE VISIT (OUTPATIENT)
Dept: FAMILY MEDICINE | Facility: CLINIC | Age: 42
End: 2022-06-24
Payer: COMMERCIAL

## 2022-06-24 VITALS
WEIGHT: 162 LBS | RESPIRATION RATE: 14 BRPM | BODY MASS INDEX: 23.99 KG/M2 | HEIGHT: 69 IN | HEART RATE: 74 BPM | OXYGEN SATURATION: 98 % | SYSTOLIC BLOOD PRESSURE: 128 MMHG | TEMPERATURE: 97.7 F | DIASTOLIC BLOOD PRESSURE: 82 MMHG

## 2022-06-24 DIAGNOSIS — R41.840 INATTENTION: Primary | ICD-10-CM

## 2022-06-24 PROCEDURE — 99213 OFFICE O/P EST LOW 20 MIN: CPT | Performed by: FAMILY MEDICINE

## 2022-06-24 ASSESSMENT — PATIENT HEALTH QUESTIONNAIRE - PHQ9
5. POOR APPETITE OR OVEREATING: NOT AT ALL
SUM OF ALL RESPONSES TO PHQ QUESTIONS 1-9: 8

## 2022-06-24 ASSESSMENT — ANXIETY QUESTIONNAIRES
7. FEELING AFRAID AS IF SOMETHING AWFUL MIGHT HAPPEN: SEVERAL DAYS
GAD7 TOTAL SCORE: 4
GAD7 TOTAL SCORE: 4
2. NOT BEING ABLE TO STOP OR CONTROL WORRYING: SEVERAL DAYS
1. FEELING NERVOUS, ANXIOUS, OR ON EDGE: NOT AT ALL
3. WORRYING TOO MUCH ABOUT DIFFERENT THINGS: SEVERAL DAYS
5. BEING SO RESTLESS THAT IT IS HARD TO SIT STILL: SEVERAL DAYS
6. BECOMING EASILY ANNOYED OR IRRITABLE: NOT AT ALL

## 2022-06-24 ASSESSMENT — PAIN SCALES - GENERAL: PAINLEVEL: NO PAIN (0)

## 2022-06-24 NOTE — PROGRESS NOTES
Assessment & Plan     Inattention  Referral for psychological evaluation, provided resources within the community as well.  Encouraged behavioral modifications to help with symptoms in the interim  - Adult Mental Health  Referral      Return in about 3 months (around 9/24/2022) for Routine preventive.    CHESTER RIVERS [facility]    Ulysses Guerrero is a 41 year old, presenting for the following health issues:  Consult      History of Present Illness       Reason for visit:  I have always struggled with focus and concentration.  Symptom onset:  More than a month  Symptoms include:  Lack of focus and concentration.  Symptom intensity:  Moderate  Symptom progression:  Staying the same  Had these symptoms before:  Yes  Has tried/received treatment for these symptoms:  No  What makes it better:  I ve tried caffeine.  It helps a little.  I also tried a variety of nootropics and supplements, but they don t seem to help at all.    He eats 2-3 servings of fruits and vegetables daily.He consumes 0 sweetened beverage(s) daily.He exercises with enough effort to increase his heart rate 10 to 19 minutes per day.  He exercises with enough effort to increase his heart rate 4 days per week.   He is taking medications regularly.     Patient endorses since he was young difficulty completing homework and difficulties with attention span.  Reports it takes him longer to complete tasks compared to his peers.  Recently went back to graduate school in his 30s and was able to keep up with courses by hiring tutors for every course.  Since completing school and being back at work he is a difficulties with jobs-difficulty remembering things mentioned in meetings and taking longer to complete tasks.  Is transitioning to a new position in about a month, is interested in being evaluated.    Denies ongoing lower mood or anxiety symptoms.  Reports sleeping well.  He exercises regularly and physically  "is feeling well.    Review of Systems   Constitutional, HEENT, cardiovascular, pulmonary, gi and gu systems are negative, except as otherwise noted.      Objective    /82 (BP Location: Right arm, Patient Position: Chair, Cuff Size: Adult Regular)   Pulse 74   Temp 97.7  F (36.5  C) (Tympanic)   Resp 14   Ht 1.753 m (5' 9\")   Wt 73.5 kg (162 lb)   SpO2 98%   BMI 23.92 kg/m    Body mass index is 23.92 kg/m .  Physical Exam  Constitutional:       General: He is not in acute distress.     Appearance: He is well-developed.   HENT:      Right Ear: External ear normal.      Mouth/Throat:      Mouth: No oral lesions.      Pharynx: No oropharyngeal exudate.   Eyes:      General:         Right eye: No discharge.         Left eye: No discharge.   Neck:      Thyroid: No thyromegaly.      Trachea: No tracheal deviation.   Cardiovascular:      Rate and Rhythm: Normal rate and regular rhythm.      Pulses: Normal pulses.      Heart sounds: Normal heart sounds, S1 normal and S2 normal. No murmur heard.    No S3 or S4 sounds.   Pulmonary:      Effort: Pulmonary effort is normal. No respiratory distress.      Breath sounds: Normal breath sounds. No wheezing or rales.   Musculoskeletal:         General: No deformity. Normal range of motion.      Cervical back: Neck supple.   Skin:     General: Skin is warm and dry.      Findings: No lesion or rash.   Neurological:      Mental Status: He is alert and oriented to person, place, and time.      Motor: No abnormal muscle tone.      Deep Tendon Reflexes: Reflexes are normal and symmetric.   Psychiatric:         Speech: Speech normal.         Thought Content: Thought content normal.         Judgment: Judgment normal.                    .  ..  "

## 2022-07-14 ENCOUNTER — TELEPHONE (OUTPATIENT)
Dept: FAMILY MEDICINE | Facility: CLINIC | Age: 42
End: 2022-07-14

## 2022-07-14 NOTE — TELEPHONE ENCOUNTER
Reason for Call: Request for an order or referral:    Order or referral being requested: Pt's spouse Griselda calling.  She would like Dr. Crocker to order a screening colonoscopy for pt.  He has zero symptoms, she just wants him to be checked for colon cancer.    She wants order for screening colonoscopy faxed to [facility] @ Fax# [phone].    Please call patient's spouse and advise.      Date needed: at your convenience    Has the patient been seen by the PCP for this problem? NO    Additional comments:     Phone number Patient's spouse can be reached at:  Home number on file [phone]    Best Time:  any    Can we leave a detailed message on this number?  YES    Call taken on 7/14/2022 at 12:33 PM by Ely Peterson

## 2022-07-15 NOTE — TELEPHONE ENCOUNTER
Hello,    With no indication for colonoscopy I would not recommend undergoing at this time.  He does not need a referral to meet with [facility] if he would like second opinion with GI provider.    CHESTER RIVERS, DO

## 2022-07-15 NOTE — TELEPHONE ENCOUNTER
Hello,    Would we be able to call  back patient and let them know that screening for colon cancer starts at age 45 unless there are other indications.  With patient not having symptoms he likely would not need to undergo screening at this time. He can set up visit to discuss further if he would like.  Thank you!    CHESTER RIVERS, DO

## 2022-07-15 NOTE — TELEPHONE ENCOUNTER
Writer spoke with patient's wife Griselda:    Patient would like to have the colonoscopy.  Patient's father  of brain cancer recently, a couple months ago.  Patient is hyper aware of of cancer.  Patient's brother in law (non blood relative) dies of colon cancer, had his first colonoscopy at 50 and that is when they found the cancer, and it was too late by then.  They would like the order faxed to [facility] [phone]    Routed to Dr Crocker,  Can patient have colonoscopy as scheduled?    Jeffy ZELAYA [facility]

## 2022-07-15 NOTE — TELEPHONE ENCOUNTER
Writer called Griselda and informed her that Dr Crocker is not willing to write the order for the colonoscopy, she believes that the risks outweigh the benefits.    Griselda expressed frustration and dissatisfaction.  Griselda did remain civil and thanked the writer for the information at the end.    Jeffy ZELAYA [facility]

## 2022-10-15 ENCOUNTER — HEALTH MAINTENANCE LETTER (OUTPATIENT)
Age: 42
End: 2022-10-15

## 2022-12-26 ENCOUNTER — HOSPITAL ENCOUNTER (EMERGENCY)
Facility: CLINIC | Age: 42
Discharge: HOME | End: 2022-12-26
Attending: NURSE PRACTITIONER | Admitting: NURSE PRACTITIONER
Payer: COMMERCIAL

## 2022-12-26 VITALS
DIASTOLIC BLOOD PRESSURE: 86 MMHG | WEIGHT: 160 LBS | RESPIRATION RATE: 18 BRPM | TEMPERATURE: 97.2 F | OXYGEN SATURATION: 98 % | BODY MASS INDEX: 23.63 KG/M2 | HEART RATE: 72 BPM | SYSTOLIC BLOOD PRESSURE: 120 MMHG

## 2022-12-26 DIAGNOSIS — Z20.818 STREPTOCOCCUS EXPOSURE: ICD-10-CM

## 2022-12-26 DIAGNOSIS — Z11.52 ENCOUNTER FOR SCREENING LABORATORY TESTING FOR COVID-19 VIRUS: ICD-10-CM

## 2022-12-26 LAB
DEPRECATED S PYO AG THROAT QL EIA: NEGATIVE
FLUAV RNA SPEC QL NAA+PROBE: NEGATIVE
FLUBV RNA RESP QL NAA+PROBE: NEGATIVE
GROUP A STREP BY PCR: NOT DETECTED
SARS-COV-2 RNA RESP QL NAA+PROBE: NEGATIVE

## 2022-12-26 PROCEDURE — 99212 OFFICE O/P EST SF 10 MIN: CPT | Mod: CS | Performed by: NURSE PRACTITIONER

## 2022-12-26 PROCEDURE — G0463 HOSPITAL OUTPT CLINIC VISIT: HCPCS | Mod: CS | Performed by: NURSE PRACTITIONER

## 2022-12-26 PROCEDURE — 87636 SARSCOV2 & INF A&B AMP PRB: CPT | Performed by: NURSE PRACTITIONER

## 2022-12-26 PROCEDURE — C9803 HOPD COVID-19 SPEC COLLECT: HCPCS | Performed by: NURSE PRACTITIONER

## 2022-12-26 PROCEDURE — 87651 STREP A DNA AMP PROBE: CPT | Performed by: NURSE PRACTITIONER

## 2022-12-26 ASSESSMENT — ACTIVITIES OF DAILY LIVING (ADL): ADLS_ACUITY_SCORE: 35

## 2022-12-26 NOTE — ED PROVIDER NOTES
History     Chief Complaint   Patient presents with     Cold Exposure     Exposed to strep throat over the past week.     HPI  Cesar Bass is a 42 year old male who presents to urgent care for testing for strep pharyngitis.  Patient has been exposed to strep pharyngitis via cousin/nephew for the past 5 days.  Nephew has been staying with the family.  Patient today denies difficulty breathing, ear pain, eye pain, abdominal pain, nausea, vomiting, difficulty urinating or stooling, fevers or chills or sweats.    Allergies:  No Known Allergies    Problem List:    Patient Active Problem List    Diagnosis Date Noted     CARDIOVASCULAR SCREENING; LDL GOAL LESS THAN 130 03/05/2013     Priority: Medium     ETD (eustachian tube dysfunction) 02/26/2013     Priority: Medium        Past Medical History:    History reviewed. No pertinent past medical history.    Past Surgical History:    History reviewed. No pertinent surgical history.    Family History:    Family History   Problem Relation Age of Onset     Hypertension Father      Diabetes Paternal Grandmother      Heart Disease Paternal Grandfather      Cerebrovascular Disease Brother      Heart Disease Brother        Social History:  Marital Status:   [2]  Social History     Tobacco Use     Smoking status: Never     Smokeless tobacco: Never   Vaping Use     Vaping Use: Never used   Substance Use Topics     Alcohol use: No     Drug use: No        Medications:    No current outpatient medications on file.    Review of Systems  As mentioned above in the history present illness. All other systems were reviewed and are negative.    Physical Exam   BP: 120/86  Pulse: 72  Temp: 97.2  F (36.2  C)  Resp: 18  Weight: 72.6 kg (160 lb)  SpO2: 98 %      Physical Exam  Vitals and nursing note reviewed.   Constitutional:       General: He is not in acute distress.     Appearance: Normal appearance. He is well-developed. He is not ill-appearing, toxic-appearing or diaphoretic.    HENT:      Head: Normocephalic and atraumatic. No contusion.      Jaw: No trismus.      Right Ear: Hearing, tympanic membrane, ear canal and external ear normal. No drainage or tenderness. Tympanic membrane is not erythematous or bulging.      Left Ear: Hearing, ear canal and external ear normal. No drainage or tenderness. Tympanic membrane is not erythematous or bulging.      Nose: No rhinorrhea.      Right Sinus: No maxillary sinus tenderness or frontal sinus tenderness.      Left Sinus: No maxillary sinus tenderness or frontal sinus tenderness.      Mouth/Throat:      Mouth: Mucous membranes are moist.      Pharynx: Oropharynx is clear. Uvula midline. No pharyngeal swelling, oropharyngeal exudate, posterior oropharyngeal erythema or uvula swelling.      Tonsils: No tonsillar exudate. 0 on the right. 0 on the left.   Eyes:      General:         Right eye: No discharge.         Left eye: No discharge.      Conjunctiva/sclera: Conjunctivae normal.   Neck:      Trachea: No tracheal deviation.   Cardiovascular:      Rate and Rhythm: Normal rate and regular rhythm.      Heart sounds: Normal heart sounds. No murmur heard.    No friction rub. No gallop.   Pulmonary:      Effort: Pulmonary effort is normal. No respiratory distress.      Breath sounds: No stridor. No wheezing or rales.   Musculoskeletal:      Cervical back: Normal range of motion and neck supple.   Lymphadenopathy:      Cervical: No cervical adenopathy.   Skin:     General: Skin is warm.      Capillary Refill: Capillary refill takes less than 2 seconds.      Findings: No rash.   Neurological:      Mental Status: He is alert and oriented to person, place, and time.   Psychiatric:         Behavior: Behavior is cooperative.         ED Course                 Procedures    Results for orders placed or performed during the hospital encounter of 12/26/22 (from the past 24 hour(s))   Streptococcus A Rapid Scr w Reflx to PCR    Specimen: Throat; Swab   Result  Value Ref Range    Group A Strep antigen Negative Negative       Medications - No data to display    Assessments & Plan (with Medical Decision Making)     I have reviewed the nursing notes.    I have reviewed the findings, diagnosis, plan and need for follow up with the patient.  Cesra Bass is a 42 year old male who presents to urgent care for testing for strep pharyngitis.  Patient has been exposed to strep pharyngitis via cousin/nephew for the past 5 days.  Nephew has been staying with the family.  Patient today denies difficulty breathing, ear pain, eye pain, abdominal pain, nausea, vomiting, difficulty urinating or stooling, fevers or chills or sweats.  On exam patient has no signs of uvulitis, tonsillitis, peritonsillar abscess, strep pharyngitis, otitis media, Elinor's angina, muffled voice is absent.  Quick strep test is negative, PCR pending, influenza and COVID is pending.  Discussed all of this with patient.  Patient to obtain the remaining results online via [employer] or by calling the nurse line.  Discussed comfort cares for family members and patient verbalized understanding and discharged in stable condition.    New Prescriptions    No medications on file       Final diagnoses:   Streptococcus exposure   Encounter for screening laboratory testing for COVID-19 virus       12/26/2022   [facility] EMERGENCY DEPT     Cari Huston, LAURA CNP  12/26/22 0081

## 2022-12-27 NOTE — RESULT ENCOUNTER NOTE
Group A Streptococcus PCR is NEGATIVE  No treatment or change in treatment [facility] ED lab result Strep Group A protocol.

## 2023-03-26 ENCOUNTER — HEALTH MAINTENANCE LETTER (OUTPATIENT)
Age: 43
End: 2023-03-26

## 2024-02-02 ENCOUNTER — APPOINTMENT (OUTPATIENT)
Dept: URBAN - METROPOLITAN AREA CLINIC 252 | Age: 44
Setting detail: DERMATOLOGY
End: 2024-02-02

## 2024-02-02 VITALS — HEIGHT: 69 IN | WEIGHT: 155 LBS

## 2024-02-02 DIAGNOSIS — L20.89 OTHER ATOPIC DERMATITIS: ICD-10-CM

## 2024-02-02 PROCEDURE — OTHER COUNSELING: OTHER

## 2024-02-02 PROCEDURE — 99204 OFFICE O/P NEW MOD 45 MIN: CPT

## 2024-02-02 PROCEDURE — OTHER PRESCRIPTION: OTHER

## 2024-02-02 RX ORDER — BETAMETHASONE DIPROPIONATE 0.5 MG/ML
0.05% LOTION TOPICAL BID
Qty: 60 | Refills: 1 | Status: ERX | COMMUNITY
Start: 2024-02-02

## 2024-02-02 RX ORDER — TRIAMCINOLONE ACETONIDE 1 MG/G
0.1% CREAM TOPICAL BID
Qty: 454 | Refills: 0 | Status: ERX | COMMUNITY
Start: 2024-02-02

## 2024-02-02 ASSESSMENT — LOCATION DETAILED DESCRIPTION DERM
LOCATION DETAILED: SUPERIOR THORACIC SPINE
LOCATION DETAILED: PERIUMBILICAL SKIN
LOCATION DETAILED: HAIR
LOCATION DETAILED: RIGHT CENTRAL FRONTAL SCALP
LOCATION DETAILED: LEFT SUPERIOR LATERAL LOWER BACK
LOCATION DETAILED: STERNUM
LOCATION DETAILED: LEFT CENTRAL FRONTAL SCALP
LOCATION DETAILED: RIGHT INFERIOR LATERAL LOWER BACK

## 2024-02-02 ASSESSMENT — LOCATION SIMPLE DESCRIPTION DERM
LOCATION SIMPLE: ABDOMEN
LOCATION SIMPLE: UPPER BACK
LOCATION SIMPLE: HAIR
LOCATION SIMPLE: CHEST
LOCATION SIMPLE: LEFT LOWER BACK
LOCATION SIMPLE: RIGHT LOWER BACK
LOCATION SIMPLE: SCALP

## 2024-02-02 ASSESSMENT — LOCATION ZONE DERM
LOCATION ZONE: TRUNK
LOCATION ZONE: SCALP

## 2024-02-02 NOTE — PROCEDURE: COUNSELING
Detail Level: Zone
Patient Specific Counseling (Will Not Stick From Patient To Patient): -Discussed possible triggers of hard water or fragranted products.\\nDiscussed since subtle a form diagnosis cannot be certain. Patient expressed understanding. Return to clinic if not resolved within 2 weeks.
Patient Specific Counseling (Will Not Stick From Patient To Patient): -Recommend Vanicream bar soap on [employer] and discontinue anything with fragrance.
Detail Level: Detailed

## 2024-02-02 NOTE — HPI: RASH
How Severe Is Your Rash?: moderate
Is This A New Presentation, Or A Follow-Up?: Rash
Additional History: Uses free and clear detergent. Uses ivory bar soap.

## 2024-05-10 ENCOUNTER — APPOINTMENT (OUTPATIENT)
Dept: URBAN - METROPOLITAN AREA CLINIC 252 | Age: 44
Setting detail: DERMATOLOGY
End: 2024-05-10

## 2024-05-10 VITALS — HEIGHT: 69 IN | WEIGHT: 155 LBS

## 2024-05-10 DIAGNOSIS — Z71.89 OTHER SPECIFIED COUNSELING: ICD-10-CM

## 2024-05-10 DIAGNOSIS — L81.4 OTHER MELANIN HYPERPIGMENTATION: ICD-10-CM

## 2024-05-10 DIAGNOSIS — L82.1 OTHER SEBORRHEIC KERATOSIS: ICD-10-CM

## 2024-05-10 DIAGNOSIS — L73.8 OTHER SPECIFIED FOLLICULAR DISORDERS: ICD-10-CM

## 2024-05-10 DIAGNOSIS — D18.0 HEMANGIOMA: ICD-10-CM

## 2024-05-10 PROBLEM — D18.01 HEMANGIOMA OF SKIN AND SUBCUTANEOUS TISSUE: Status: ACTIVE | Noted: 2024-05-10

## 2024-05-10 PROCEDURE — 99213 OFFICE O/P EST LOW 20 MIN: CPT

## 2024-05-10 PROCEDURE — OTHER COUNSELING: OTHER

## 2024-05-10 ASSESSMENT — LOCATION SIMPLE DESCRIPTION DERM
LOCATION SIMPLE: RIGHT HAND
LOCATION SIMPLE: RIGHT UPPER BACK
LOCATION SIMPLE: LEFT HAND
LOCATION SIMPLE: RIGHT CLAVICULAR SKIN
LOCATION SIMPLE: RIGHT CHEEK
LOCATION SIMPLE: CHEST

## 2024-05-10 ASSESSMENT — LOCATION DETAILED DESCRIPTION DERM
LOCATION DETAILED: LEFT RADIAL DORSAL HAND
LOCATION DETAILED: RIGHT CLAVICULAR SKIN
LOCATION DETAILED: RIGHT MEDIAL MALAR CHEEK
LOCATION DETAILED: RIGHT INFERIOR UPPER BACK
LOCATION DETAILED: RIGHT ULNAR DORSAL HAND
LOCATION DETAILED: RIGHT MEDIAL SUPERIOR CHEST
LOCATION DETAILED: LEFT MEDIAL SUPERIOR CHEST

## 2024-05-10 ASSESSMENT — LOCATION ZONE DERM
LOCATION ZONE: TRUNK
LOCATION ZONE: FACE
LOCATION ZONE: HAND

## 2024-05-10 NOTE — PROCEDURE: COUNSELING
Detail Level: Simple
Detail Level: Zone
Detail Level: Detailed
Patient Specific Counseling (Will Not Stick From Patient To Patient): Recommended daily use of sunscreen \\nSamples of cera ve am and Neutrogena hydro boost sunscreen given to pt\\nRecommended cera ve vitamin c serum
Detail Level: Generalized
No

## 2024-05-26 ENCOUNTER — HEALTH MAINTENANCE LETTER (OUTPATIENT)
Age: 44
End: 2024-05-26

## 2024-06-14 NOTE — CONFIDENTIAL NOTE
DIAGNOSIS:  Abnormal Creatinine Levels    DATE RECEIVED: 08.16.2024    NOTES STATUS DETAILS   OFFICE NOTE from other specialist  Care Everywhere 07.25.2024 [facility]    LABS     UA Care Everywhere 07.26.2024   URINE PROTEIN Care Everywhere 07.26.2024   RENAL PANEL Care Everywhere 07.26.2024      Action 06.14.2024 RM   Action Taken Pending records     Action 07.29.2024 RM   Action Taken Care Everywhere is available

## 2024-08-07 DIAGNOSIS — N18.9 ANEMIA IN CHRONIC KIDNEY DISEASE, UNSPECIFIED CKD STAGE: Primary | ICD-10-CM

## 2024-08-07 DIAGNOSIS — D63.1 ANEMIA IN CHRONIC KIDNEY DISEASE, UNSPECIFIED CKD STAGE: Primary | ICD-10-CM

## 2024-08-16 ENCOUNTER — PRE VISIT (OUTPATIENT)
Dept: NEPHROLOGY | Facility: CLINIC | Age: 44
End: 2024-08-16

## 2025-06-14 ENCOUNTER — HEALTH MAINTENANCE LETTER (OUTPATIENT)
Age: 45
End: 2025-06-14